# Patient Record
Sex: FEMALE | Race: WHITE | Employment: OTHER | URBAN - METROPOLITAN AREA
[De-identification: names, ages, dates, MRNs, and addresses within clinical notes are randomized per-mention and may not be internally consistent; named-entity substitution may affect disease eponyms.]

---

## 2018-02-06 PROCEDURE — 88175 CYTOPATH C/V AUTO FLUID REDO: CPT | Performed by: OBSTETRICS & GYNECOLOGY

## 2019-06-25 ENCOUNTER — TELEPHONE (OUTPATIENT)
Dept: INTERNAL MEDICINE CLINIC | Facility: CLINIC | Age: 84
End: 2019-06-25

## 2019-06-25 NOTE — TELEPHONE ENCOUNTER
PT progress note reviewed and signed by Dr. Dang Ritchie back to [facility] at [phone]. Fax confirmation received and sent to scanning.

## 2024-08-06 ENCOUNTER — HOSPITAL ENCOUNTER (OUTPATIENT)
Dept: LAB | Age: 89
Discharge: HOME | End: 2024-08-06
Attending: INTERNAL MEDICINE

## 2024-08-06 DIAGNOSIS — N18.31 CHRONIC KIDNEY DISEASE, STAGE 3A  (CMD): ICD-10-CM

## 2024-08-06 DIAGNOSIS — N18.31 CHRONIC KIDNEY DISEASE, STAGE 3A  (CMD): Primary | ICD-10-CM

## 2024-08-06 LAB
ALBUMIN SERPL-MCNC: 3.6 G/DL (ref 3.6–5.1)
ALBUMIN/GLOB SERPL: 1.1 {RATIO} (ref 1–2.4)
ALP SERPL-CCNC: 120 UNITS/L (ref 45–117)
ALT SERPL-CCNC: 16 UNITS/L
ANION GAP SERPL CALC-SCNC: 14 MMOL/L (ref 7–19)
APPEARANCE UR: CLEAR
AST SERPL-CCNC: 26 UNITS/L
BACTERIA #/AREA URNS HPF: ABNORMAL /HPF
BILIRUB SERPL-MCNC: 0.6 MG/DL (ref 0.2–1)
BILIRUB UR QL STRIP: NEGATIVE
BUN SERPL-MCNC: 82 MG/DL (ref 6–20)
BUN/CREAT SERPL: 45 (ref 7–25)
CALCIUM SERPL-MCNC: 9.2 MG/DL (ref 8.4–10.2)
CHLORIDE SERPL-SCNC: 99 MMOL/L (ref 97–110)
CO2 SERPL-SCNC: 26 MMOL/L (ref 21–32)
COLOR UR: ABNORMAL
CREAT SERPL-MCNC: 1.82 MG/DL (ref 0.51–0.95)
CREAT UR-MCNC: 41.1 MG/DL
CREAT UR-MCNC: 42.2 MG/DL
DEPRECATED RDW RBC: 48.4 FL (ref 39–50)
EGFRCR SERPLBLD CKD-EPI 2021: 27 ML/MIN/{1.73_M2}
ERYTHROCYTE [DISTWIDTH] IN BLOOD: 14.6 % (ref 11–15)
FASTING DURATION TIME PATIENT: 0 HOURS (ref 0–999)
GLOBULIN SER-MCNC: 3.3 G/DL (ref 2–4)
GLUCOSE SERPL-MCNC: 113 MG/DL (ref 70–99)
GLUCOSE UR STRIP-MCNC: NEGATIVE MG/DL
HCT VFR BLD CALC: 34 % (ref 36–46.5)
HGB BLD-MCNC: 11.2 G/DL (ref 12–15.5)
HGB UR QL STRIP: NEGATIVE
HYALINE CASTS #/AREA URNS LPF: ABNORMAL /LPF
KETONES UR STRIP-MCNC: NEGATIVE MG/DL
LEUKOCYTE ESTERASE UR QL STRIP: ABNORMAL
MCH RBC QN AUTO: 29.9 PG (ref 26–34)
MCHC RBC AUTO-ENTMCNC: 32.9 G/DL (ref 32–36.5)
MCV RBC AUTO: 90.9 FL (ref 78–100)
MICROALBUMIN UR-MCNC: 1.07 MG/DL
MICROALBUMIN/CREAT UR: 25.4 MG/G
MUCOUS THREADS URNS QL MICRO: PRESENT
NITRITE UR QL STRIP: NEGATIVE
NRBC BLD MANUAL-RTO: 0 /100 WBC
PH UR STRIP: 5.5 [PH] (ref 5–7)
PLATELET # BLD AUTO: 198 K/MCL (ref 140–450)
POTASSIUM SERPL-SCNC: 4.4 MMOL/L (ref 3.4–5.1)
PROT SERPL-MCNC: 6.9 G/DL (ref 6.4–8.2)
PROT UR STRIP-MCNC: NEGATIVE MG/DL
PROT UR-MCNC: 6 MG/DL
PROT/CREAT UR: 146 MGPR/GCR
RBC # BLD: 3.74 MIL/MCL (ref 4–5.2)
RBC #/AREA URNS HPF: ABNORMAL /HPF
SODIUM SERPL-SCNC: 135 MMOL/L (ref 135–145)
SP GR UR STRIP: 1.01 (ref 1–1.03)
SQUAMOUS #/AREA URNS HPF: ABNORMAL /HPF
UROBILINOGEN UR STRIP-MCNC: 0.2 MG/DL
WBC # BLD: 7.8 K/MCL (ref 4.2–11)
WBC #/AREA URNS HPF: >100 /HPF

## 2024-08-06 PROCEDURE — 82570 ASSAY OF URINE CREATININE: CPT | Performed by: CLINICAL MEDICAL LABORATORY

## 2024-08-06 PROCEDURE — 82043 UR ALBUMIN QUANTITATIVE: CPT | Performed by: CLINICAL MEDICAL LABORATORY

## 2024-08-06 PROCEDURE — 36415 COLL VENOUS BLD VENIPUNCTURE: CPT | Performed by: INTERNAL MEDICINE

## 2024-08-06 PROCEDURE — 81001 URINALYSIS AUTO W/SCOPE: CPT | Performed by: CLINICAL MEDICAL LABORATORY

## 2024-08-06 PROCEDURE — 84156 ASSAY OF PROTEIN URINE: CPT | Performed by: CLINICAL MEDICAL LABORATORY

## 2024-08-22 ENCOUNTER — HOSPITAL ENCOUNTER (OUTPATIENT)
Dept: LAB | Age: 89
Discharge: HOME | End: 2024-08-22

## 2024-08-22 DIAGNOSIS — N18.31 CHRONIC KIDNEY DISEASE, STAGE 3A  (CMD): ICD-10-CM

## 2024-08-22 DIAGNOSIS — N18.31 CHRONIC KIDNEY DISEASE, STAGE 3A  (CMD): Primary | ICD-10-CM

## 2024-08-22 LAB
ANION GAP SERPL CALC-SCNC: 12 MMOL/L (ref 7–19)
BUN SERPL-MCNC: 52 MG/DL (ref 6–20)
BUN/CREAT SERPL: 33 (ref 7–25)
CALCIUM SERPL-MCNC: 9.2 MG/DL (ref 8.4–10.2)
CHLORIDE SERPL-SCNC: 103 MMOL/L (ref 97–110)
CO2 SERPL-SCNC: 29 MMOL/L (ref 21–32)
CREAT SERPL-MCNC: 1.57 MG/DL (ref 0.51–0.95)
EGFRCR SERPLBLD CKD-EPI 2021: 32 ML/MIN/{1.73_M2}
FASTING DURATION TIME PATIENT: ABNORMAL H
GLUCOSE SERPL-MCNC: 107 MG/DL (ref 70–99)
POTASSIUM SERPL-SCNC: 4.6 MMOL/L (ref 3.4–5.1)
SODIUM SERPL-SCNC: 139 MMOL/L (ref 135–145)

## 2024-11-20 ENCOUNTER — APPOINTMENT (OUTPATIENT)
Dept: GENERAL RADIOLOGY | Age: 89
End: 2024-11-20

## 2024-11-20 ENCOUNTER — HOSPITAL ENCOUNTER (OUTPATIENT)
Dept: LAB | Age: 89
Discharge: HOME | End: 2024-11-20

## 2024-11-20 DIAGNOSIS — I12.9 HYPERTENSIVE CHRONIC KIDNEY DISEASE WITH STAGE 1 THROUGH STAGE 4 CHRONIC KIDNEY DISEASE, OR UNSPECIFIED CHRONIC KIDNEY DISEASE: ICD-10-CM

## 2024-11-20 DIAGNOSIS — N18.32 CHRONIC KIDNEY DISEASE, STAGE 3B  (CMD): ICD-10-CM

## 2024-11-20 DIAGNOSIS — I12.9 HYPERTENSIVE CHRONIC KIDNEY DISEASE WITH STAGE 1 THROUGH STAGE 4 CHRONIC KIDNEY DISEASE, OR UNSPECIFIED CHRONIC KIDNEY DISEASE: Primary | ICD-10-CM

## 2024-11-20 LAB
DEPRECATED RDW RBC: 51.2 FL (ref 39–50)
ERYTHROCYTE [DISTWIDTH] IN BLOOD: 15.4 % (ref 11–15)
HCT VFR BLD CALC: 36.8 % (ref 36–46.5)
HGB BLD-MCNC: 11.5 G/DL (ref 12–15.5)
MCH RBC QN AUTO: 28.5 PG (ref 26–34)
MCHC RBC AUTO-ENTMCNC: 31.3 G/DL (ref 32–36.5)
MCV RBC AUTO: 91.3 FL (ref 78–100)
NRBC BLD MANUAL-RTO: 0 /100 WBC
PLATELET # BLD AUTO: 217 K/MCL (ref 140–450)
RBC # BLD: 4.03 MIL/MCL (ref 4–5.2)
WBC # BLD: 8.1 K/MCL (ref 4.2–11)

## 2024-11-21 LAB — PTH-INTACT SERPL-MCNC: 97 PG/ML (ref 19–88)

## 2024-11-22 ENCOUNTER — LAB SERVICES (OUTPATIENT)
Dept: LAB | Age: 89
End: 2024-11-22

## 2024-11-22 LAB
CREAT UR-MCNC: 90.5 MG/DL
PROT UR-MCNC: 29 MG/DL
PROT/CREAT UR: 320 MGPR/GCR

## 2024-11-22 PROCEDURE — 84156 ASSAY OF PROTEIN URINE: CPT | Performed by: CLINICAL MEDICAL LABORATORY

## 2024-11-22 PROCEDURE — 82570 ASSAY OF URINE CREATININE: CPT | Performed by: CLINICAL MEDICAL LABORATORY

## 2025-03-20 ENCOUNTER — HOSPITAL ENCOUNTER (OUTPATIENT)
Dept: LAB | Age: 89
Discharge: HOME | End: 2025-03-20

## 2025-03-20 DIAGNOSIS — N18.32 CHRONIC KIDNEY DISEASE, STAGE 3B  (CMD): ICD-10-CM

## 2025-03-20 DIAGNOSIS — N18.32 CHRONIC KIDNEY DISEASE, STAGE 3B  (CMD): Primary | ICD-10-CM

## 2025-03-20 LAB
25(OH)D3+25(OH)D2 SERPL-MCNC: 48 NG/ML (ref 30–100)
ALBUMIN SERPL-MCNC: 4 G/DL (ref 3.4–5)
ANION GAP SERPL CALC-SCNC: 10 MMOL/L (ref 7–19)
BUN SERPL-MCNC: 49 MG/DL (ref 6–20)
BUN/CREAT SERPL: 34 (ref 7–25)
CALCIUM SERPL-MCNC: 9.3 MG/DL (ref 8.4–10.2)
CHLORIDE SERPL-SCNC: 109 MMOL/L (ref 97–110)
CO2 SERPL-SCNC: 26 MMOL/L (ref 21–32)
CREAT SERPL-MCNC: 1.46 MG/DL (ref 0.51–0.95)
DEPRECATED RDW RBC: 50.2 FL (ref 39–50)
EGFRCR SERPLBLD CKD-EPI 2021: 35 ML/MIN/{1.73_M2}
ERYTHROCYTE [DISTWIDTH] IN BLOOD: 15 % (ref 11–15)
FASTING DURATION TIME PATIENT: ABNORMAL H
FERRITIN SERPL-MCNC: 143 NG/ML (ref 8–252)
GLUCOSE SERPL-MCNC: 110 MG/DL (ref 70–99)
HCT VFR BLD CALC: 40.8 % (ref 36–46.5)
HGB BLD-MCNC: 12.9 G/DL (ref 12–15.5)
IRON SATN MFR SERPL: 18 % (ref 15–45)
IRON SERPL-MCNC: 60 MCG/DL (ref 50–170)
MCH RBC QN AUTO: 28.9 PG (ref 26–34)
MCHC RBC AUTO-ENTMCNC: 31.6 G/DL (ref 32–36.5)
MCV RBC AUTO: 91.3 FL (ref 78–100)
NRBC BLD MANUAL-RTO: 0 /100 WBC
PHOSPHATE SERPL-MCNC: 4.3 MG/DL (ref 2.4–4.7)
PLATELET # BLD AUTO: 192 K/MCL (ref 140–450)
POTASSIUM SERPL-SCNC: 5.1 MMOL/L (ref 3.4–5.1)
RBC # BLD: 4.47 MIL/MCL (ref 4–5.2)
SODIUM SERPL-SCNC: 140 MMOL/L (ref 135–145)
TIBC SERPL-MCNC: 342 MCG/DL (ref 250–450)
WBC # BLD: 7.8 K/MCL (ref 4.2–11)

## 2025-03-21 LAB — PTH-INTACT SERPL-MCNC: 89 PG/ML (ref 19–88)
